# Patient Record
Sex: MALE | URBAN - METROPOLITAN AREA
[De-identification: names, ages, dates, MRNs, and addresses within clinical notes are randomized per-mention and may not be internally consistent; named-entity substitution may affect disease eponyms.]

---

## 2023-10-12 ENCOUNTER — OFFICE VISIT (OUTPATIENT)
Dept: URBAN - METROPOLITAN AREA CLINIC 128 | Facility: CLINIC | Age: 28
End: 2023-10-12
Payer: COMMERCIAL

## 2023-10-12 VITALS
DIASTOLIC BLOOD PRESSURE: 84 MMHG | HEIGHT: 67 IN | SYSTOLIC BLOOD PRESSURE: 132 MMHG | WEIGHT: 210 LBS | HEART RATE: 83 BPM | BODY MASS INDEX: 32.96 KG/M2 | TEMPERATURE: 97.3 F

## 2023-10-12 DIAGNOSIS — K62.5 RECTAL BLEEDING: ICD-10-CM

## 2023-10-12 DIAGNOSIS — K64.8 HEMORRHOIDS, INTERNAL: ICD-10-CM

## 2023-10-12 PROCEDURE — 99204 OFFICE O/P NEW MOD 45 MIN: CPT | Performed by: INTERNAL MEDICINE

## 2023-10-12 RX ORDER — HYDROCORTISONE 25 MG/G
1 APPLICATION CREAM TOPICAL TWICE A DAY
Qty: 30 | Refills: 1 | OUTPATIENT
Start: 2023-10-12 | End: 2023-11-08

## 2023-10-12 NOTE — PHYSICAL EXAM GASTROINTESTINAL
Abdomen: soft nt non-distended. No hepatosplenomegaly. no guarding or rebound. Rectal: internal hemorrhoids x 2 no blood.

## 2023-10-12 NOTE — HPI-TODAY'S VISIT:
patient presents with c/o perianal pain and bleeding. starting 1.5 months ago experienaced perianal pain and bleeding for a few days which resolved spontaneously. then last w/e noted blood on tp for a day or 2. none now. no pain with most recent episode.

## 2023-11-30 ENCOUNTER — CLAIMS CREATED FROM THE CLAIM WINDOW (OUTPATIENT)
Dept: URBAN - METROPOLITAN AREA CLINIC 128 | Facility: CLINIC | Age: 28
End: 2023-11-30
Payer: COMMERCIAL

## 2023-11-30 VITALS
TEMPERATURE: 98.2 F | WEIGHT: 212 LBS | BODY MASS INDEX: 33.27 KG/M2 | DIASTOLIC BLOOD PRESSURE: 83 MMHG | SYSTOLIC BLOOD PRESSURE: 132 MMHG | HEART RATE: 86 BPM | HEIGHT: 67 IN

## 2023-11-30 DIAGNOSIS — K62.5 RECTAL BLEEDING: ICD-10-CM

## 2023-11-30 DIAGNOSIS — K64.8 HEMORRHOIDS, INTERNAL: ICD-10-CM

## 2023-11-30 PROCEDURE — 99213 OFFICE O/P EST LOW 20 MIN: CPT | Performed by: INTERNAL MEDICINE

## 2023-11-30 NOTE — HPI-TODAY'S VISIT:
f/u for int hemorrhoids. now asymptomatic fter course of anusol hc. no bleeding. interested in banding for longterm solution. concerned re cost. will d/w financial counselor. o/w doing well.

## 2024-05-02 ENCOUNTER — OFFICE VISIT (OUTPATIENT)
Dept: URBAN - METROPOLITAN AREA CLINIC 128 | Facility: CLINIC | Age: 29
End: 2024-05-02

## 2024-05-02 ENCOUNTER — DASHBOARD ENCOUNTERS (OUTPATIENT)
Age: 29
End: 2024-05-02